# Patient Record
Sex: FEMALE | Employment: FULL TIME | URBAN - METROPOLITAN AREA
[De-identification: names, ages, dates, MRNs, and addresses within clinical notes are randomized per-mention and may not be internally consistent; named-entity substitution may affect disease eponyms.]

---

## 2023-11-28 NOTE — PROGRESS NOTES
Subjective:   Patient ID: Linnea Ruano is a 53 y.o. female.  Chief Complaint:  Establish Care and Annual Exam    Presents to establish care/annual physical exam   Has not seen an MD and years     Denies any significant past medical history  Surgical history for hysterectomy and lumpectomy of breast  No known drug allergies   No current medications   Social history includes former smoker.  Quit 2 years ago.  Does have 20 pack-year history.  Drinks 2-4 alcoholic beverages per week.  No illicit drug use.  Family history includes mother, , hypertension, coronary artery disease, heart attack.  Father's history is unknown.  No known colon or breast cancer.    Health maintenance with unknown last tetanus booster, no previous COVID vaccines, no previous shingles vaccines, and reported flu vaccine in     Main complaint/concern today is right hand numbness and tingling   Primarily 1st 3 digits   Greatest at night and causing difficulty sleeping  Over-the-counter Tylenol no help    Review of Systems   Constitutional:  Negative for chills, fatigue and fever.   HENT:  Negative for congestion, dental problem, ear pain, postnasal drip, rhinorrhea, sinus pressure and sore throat.    Eyes:  Negative for visual disturbance.   Respiratory:  Negative for cough, chest tightness, shortness of breath and wheezing.    Cardiovascular:  Negative for chest pain, palpitations and leg swelling.   Gastrointestinal:  Negative for abdominal pain, blood in stool, constipation, diarrhea, nausea and vomiting.   Endocrine: Negative for polydipsia, polyphagia and polyuria.   Genitourinary:  Negative for difficulty urinating, dysuria, flank pain, hematuria and pelvic pain.   Musculoskeletal:  Negative for myalgias.   Skin:  Negative for rash.   Neurological:  Positive for numbness. Negative for dizziness, tremors, syncope, weakness, light-headedness and headaches.   Hematological:  Negative for adenopathy.   Psychiatric/Behavioral:   "Negative for decreased concentration, dysphoric mood and sleep disturbance. The patient is not nervous/anxious.        Objective:   BP (!) 142/92 (BP Location: Left arm, Patient Position: Sitting, BP Method: Large (Manual))   Pulse 84   Ht 5' 1" (1.549 m)   Wt 87.7 kg (193 lb 5.5 oz)   SpO2 97%   BMI 36.53 kg/m²     Physical Exam  Vitals and nursing note reviewed.   Constitutional:       Appearance: Normal appearance. She is well-developed. She is obese.      Comments:   Blood pressure mildly elevated   HENT:      Right Ear: Tympanic membrane and ear canal normal.      Left Ear: Tympanic membrane and ear canal normal.      Mouth/Throat:      Dentition: Abnormal dentition. Gingival swelling and dental caries present.      Pharynx: Oropharynx is clear. Uvula midline.   Neck:      Thyroid: No thyroid mass, thyromegaly or thyroid tenderness.   Cardiovascular:      Rate and Rhythm: Normal rate and regular rhythm.      Heart sounds: Murmur heard.   Pulmonary:      Effort: Pulmonary effort is normal.      Breath sounds: Normal breath sounds.   Abdominal:      General: There is no distension.      Palpations: Abdomen is soft.      Tenderness: There is no abdominal tenderness.   Musculoskeletal:      Right lower leg: No edema.      Left lower leg: No edema.   Skin:     Findings: No rash.   Neurological:      Mental Status: She is alert and oriented to person, place, and time.   Psychiatric:         Mood and Affect: Mood and affect normal.       Assessment:       ICD-10-CM ICD-9-CM   1. Annual physical exam  Z00.00 V70.0   2. Need for hepatitis C screening test  Z11.59 V73.89   3. Screening for HIV (human immunodeficiency virus)  Z11.4 V73.89   4. Screening for colon cancer  Z12.11 V76.51   5. Encounter for gynecological examination  Z01.419 V72.31   6. Encounter for screening mammogram for malignant neoplasm of breast  Z12.31 V76.12   7. Heart murmur  R01.1 785.2   8. Dental caries  K02.9 521.00   9. Elevated blood " pressure reading without diagnosis of hypertension  R03.0 796.2   10. Carpal tunnel syndrome, right  G56.01 354.0   11. Former smoker  Z87.891 V15.82   12. Screening for lung cancer  Z12.2 V76.0     Plan:   Annual physical exam  Need for hepatitis C screening test  Screening for HIV (human immunodeficiency virus)  Screening for colon cancer  Encounter for gynecological examination  Encounter for screening mammogram for malignant neoplasm of breast  -     CBC Without Differential; Future; Expected date: 11/29/2023  -     Comprehensive Metabolic Panel; Future; Expected date: 11/29/2023  -     Lipid Panel; Future; Expected date: 11/29/2023  -     TSH; Future; Expected date: 11/29/2023  -     Hemoglobin A1C; Future; Expected date: 11/29/2023  -     HIV 1/2 Ag/Ab (4th Gen); Future; Expected date: 11/29/2023  -     Hepatitis C Antibody; Future; Expected date: 11/29/2023  -     Mammo Digital Screening Bilat w/ Deandre; Future; Expected date: 11/29/2023  -     Ambulatory referral/consult to Gynecology; Future; Expected date: 12/06/2023  -     Cologuard Screening (Multitarget Stool DNA); Future; Expected date: 11/29/2023  Blood pressure elevated.  BMI 36.5.  Exam remarkable for dental caries and heartburn.    Check labs.  Treat as indicated.    Agrees to Cologuard test for colon cancer screening  Gyn exam and mammogram ordered   Rediscuss tetanus booster, shingles vaccine, COVID vaccine, flu vaccine at follow-up visit    Heart murmur  Dental caries  -     Echo; Future    Elevated blood pressure reading without diagnosis of hypertension  Patient education/handout on diet and lifestyle changes to help promote better blood pressure control  Recheck blood pressure at follow-up visit   If remains elevated, will need to start medication     Carpal tunnel syndrome, right  Patient education/handout on carpal tunnel syndrome   Obtain wrist splint and wear nightly sleeping   Take ibuprofen 3 pills 30 minutes prior to bedtime   Reassess  at follow-up visit     Former smoker  Screening for lung cancer  -     CT Chest Lung Screening Low Dose; Future; Expected date: 11/29/2023  Greater than 50 years old, only quit smoking 2 years ago, greater than 20 pack year history.    Meets criteria for low-dose CT lung cancer screening.  Agrees to testing.  Image ordered.      Return to clinic one-month

## 2023-11-29 ENCOUNTER — TELEPHONE (OUTPATIENT)
Dept: INTERNAL MEDICINE | Facility: CLINIC | Age: 53
End: 2023-11-29

## 2023-11-29 ENCOUNTER — OFFICE VISIT (OUTPATIENT)
Dept: INTERNAL MEDICINE | Facility: CLINIC | Age: 53
End: 2023-11-29
Payer: COMMERCIAL

## 2023-11-29 ENCOUNTER — LAB VISIT (OUTPATIENT)
Dept: LAB | Facility: HOSPITAL | Age: 53
End: 2023-11-29
Attending: FAMILY MEDICINE
Payer: COMMERCIAL

## 2023-11-29 VITALS
BODY MASS INDEX: 36.5 KG/M2 | WEIGHT: 193.31 LBS | SYSTOLIC BLOOD PRESSURE: 142 MMHG | OXYGEN SATURATION: 97 % | HEIGHT: 61 IN | DIASTOLIC BLOOD PRESSURE: 92 MMHG | HEART RATE: 84 BPM

## 2023-11-29 DIAGNOSIS — Z11.4 SCREENING FOR HIV (HUMAN IMMUNODEFICIENCY VIRUS): ICD-10-CM

## 2023-11-29 DIAGNOSIS — R01.1 HEART MURMUR: ICD-10-CM

## 2023-11-29 DIAGNOSIS — Z01.419 ENCOUNTER FOR GYNECOLOGICAL EXAMINATION: ICD-10-CM

## 2023-11-29 DIAGNOSIS — Z11.59 NEED FOR HEPATITIS C SCREENING TEST: ICD-10-CM

## 2023-11-29 DIAGNOSIS — Z12.11 SCREENING FOR COLON CANCER: ICD-10-CM

## 2023-11-29 DIAGNOSIS — Z12.2 SCREENING FOR LUNG CANCER: ICD-10-CM

## 2023-11-29 DIAGNOSIS — Z00.00 ANNUAL PHYSICAL EXAM: Primary | ICD-10-CM

## 2023-11-29 DIAGNOSIS — Z12.31 ENCOUNTER FOR SCREENING MAMMOGRAM FOR MALIGNANT NEOPLASM OF BREAST: ICD-10-CM

## 2023-11-29 DIAGNOSIS — G56.01 CARPAL TUNNEL SYNDROME, RIGHT: ICD-10-CM

## 2023-11-29 DIAGNOSIS — Z00.00 ANNUAL PHYSICAL EXAM: ICD-10-CM

## 2023-11-29 DIAGNOSIS — K02.9 DENTAL CARIES: ICD-10-CM

## 2023-11-29 DIAGNOSIS — R03.0 ELEVATED BLOOD PRESSURE READING WITHOUT DIAGNOSIS OF HYPERTENSION: ICD-10-CM

## 2023-11-29 DIAGNOSIS — Z87.891 FORMER SMOKER: ICD-10-CM

## 2023-11-29 LAB
ESTIMATED AVG GLUCOSE: 74 MG/DL (ref 68–131)
HBA1C MFR BLD: 4.2 % (ref 4–5.6)

## 2023-11-29 PROCEDURE — 1160F PR REVIEW ALL MEDS BY PRESCRIBER/CLIN PHARMACIST DOCUMENTED: ICD-10-PCS | Mod: CPTII,S$GLB,, | Performed by: FAMILY MEDICINE

## 2023-11-29 PROCEDURE — 87389 HIV-1 AG W/HIV-1&-2 AB AG IA: CPT | Performed by: FAMILY MEDICINE

## 2023-11-29 PROCEDURE — 80053 COMPREHEN METABOLIC PANEL: CPT | Performed by: FAMILY MEDICINE

## 2023-11-29 PROCEDURE — 1160F RVW MEDS BY RX/DR IN RCRD: CPT | Mod: CPTII,S$GLB,, | Performed by: FAMILY MEDICINE

## 2023-11-29 PROCEDURE — 3080F DIAST BP >= 90 MM HG: CPT | Mod: CPTII,S$GLB,, | Performed by: FAMILY MEDICINE

## 2023-11-29 PROCEDURE — 3077F SYST BP >= 140 MM HG: CPT | Mod: CPTII,S$GLB,, | Performed by: FAMILY MEDICINE

## 2023-11-29 PROCEDURE — 83036 HEMOGLOBIN GLYCOSYLATED A1C: CPT | Performed by: FAMILY MEDICINE

## 2023-11-29 PROCEDURE — 3008F PR BODY MASS INDEX (BMI) DOCUMENTED: ICD-10-PCS | Mod: CPTII,S$GLB,, | Performed by: FAMILY MEDICINE

## 2023-11-29 PROCEDURE — 99386 PREV VISIT NEW AGE 40-64: CPT | Mod: S$GLB,,, | Performed by: FAMILY MEDICINE

## 2023-11-29 PROCEDURE — 3008F BODY MASS INDEX DOCD: CPT | Mod: CPTII,S$GLB,, | Performed by: FAMILY MEDICINE

## 2023-11-29 PROCEDURE — 84439 ASSAY OF FREE THYROXINE: CPT | Performed by: FAMILY MEDICINE

## 2023-11-29 PROCEDURE — 3080F PR MOST RECENT DIASTOLIC BLOOD PRESSURE >= 90 MM HG: ICD-10-PCS | Mod: CPTII,S$GLB,, | Performed by: FAMILY MEDICINE

## 2023-11-29 PROCEDURE — 36415 COLL VENOUS BLD VENIPUNCTURE: CPT | Performed by: FAMILY MEDICINE

## 2023-11-29 PROCEDURE — 84443 ASSAY THYROID STIM HORMONE: CPT | Performed by: FAMILY MEDICINE

## 2023-11-29 PROCEDURE — 1159F PR MEDICATION LIST DOCUMENTED IN MEDICAL RECORD: ICD-10-PCS | Mod: CPTII,S$GLB,, | Performed by: FAMILY MEDICINE

## 2023-11-29 PROCEDURE — 99999 PR PBB SHADOW E&M-NEW PATIENT-LVL IV: CPT | Mod: PBBFAC,,, | Performed by: FAMILY MEDICINE

## 2023-11-29 PROCEDURE — 99386 PR PREVENTIVE VISIT,NEW,40-64: ICD-10-PCS | Mod: S$GLB,,, | Performed by: FAMILY MEDICINE

## 2023-11-29 PROCEDURE — 99999 PR PBB SHADOW E&M-NEW PATIENT-LVL IV: ICD-10-PCS | Mod: PBBFAC,,, | Performed by: FAMILY MEDICINE

## 2023-11-29 PROCEDURE — 86803 HEPATITIS C AB TEST: CPT | Performed by: FAMILY MEDICINE

## 2023-11-29 PROCEDURE — 1159F MED LIST DOCD IN RCRD: CPT | Mod: CPTII,S$GLB,, | Performed by: FAMILY MEDICINE

## 2023-11-29 PROCEDURE — 80061 LIPID PANEL: CPT | Performed by: FAMILY MEDICINE

## 2023-11-29 PROCEDURE — 3077F PR MOST RECENT SYSTOLIC BLOOD PRESSURE >= 140 MM HG: ICD-10-PCS | Mod: CPTII,S$GLB,, | Performed by: FAMILY MEDICINE

## 2023-11-29 PROCEDURE — 85027 COMPLETE CBC AUTOMATED: CPT | Performed by: FAMILY MEDICINE

## 2023-11-29 NOTE — TELEPHONE ENCOUNTER
----- Message from Rafael Montgomery sent at 11/29/2023  3:52 PM CST -----  Contact: patient  Type:  Patient Call          Who Called: patient         Does the patient know what this is regarding?: requesting a call back for a 2 week follow up appt from her visit on today ; please advise           Would the patient rather a call back or a response via [facility]? Call           Best Call Back Number:[phone] (home)              Additional Information:

## 2023-11-30 DIAGNOSIS — R76.8 HEPATITIS C ANTIBODY TEST POSITIVE: Primary | ICD-10-CM

## 2023-11-30 LAB
ALBUMIN SERPL BCP-MCNC: 4 G/DL (ref 3.5–5.2)
ALP SERPL-CCNC: 132 U/L (ref 55–135)
ALT SERPL W/O P-5'-P-CCNC: 66 U/L (ref 10–44)
ANION GAP SERPL CALC-SCNC: 10 MMOL/L (ref 8–16)
AST SERPL-CCNC: 39 U/L (ref 10–40)
BILIRUB SERPL-MCNC: 0.4 MG/DL (ref 0.1–1)
BUN SERPL-MCNC: 18 MG/DL (ref 6–20)
CALCIUM SERPL-MCNC: 9.8 MG/DL (ref 8.7–10.5)
CHLORIDE SERPL-SCNC: 103 MMOL/L (ref 95–110)
CHOLEST SERPL-MCNC: 250 MG/DL (ref 120–199)
CHOLEST/HDLC SERPL: 3.5 {RATIO} (ref 2–5)
CO2 SERPL-SCNC: 25 MMOL/L (ref 23–29)
CREAT SERPL-MCNC: 0.8 MG/DL (ref 0.5–1.4)
ERYTHROCYTE [DISTWIDTH] IN BLOOD BY AUTOMATED COUNT: 12.8 % (ref 11.5–14.5)
EST. GFR  (NO RACE VARIABLE): >60 ML/MIN/1.73 M^2
GLUCOSE SERPL-MCNC: 82 MG/DL (ref 70–110)
HCT VFR BLD AUTO: 47.5 % (ref 37–48.5)
HCV AB SERPL QL IA: REACTIVE
HDLC SERPL-MCNC: 71 MG/DL (ref 40–75)
HDLC SERPL: 28.4 % (ref 20–50)
HGB BLD-MCNC: 15.6 G/DL (ref 12–16)
HIV 1+2 AB+HIV1 P24 AG SERPL QL IA: NORMAL
LDLC SERPL CALC-MCNC: 156.6 MG/DL (ref 63–159)
MCH RBC QN AUTO: 30.4 PG (ref 27–31)
MCHC RBC AUTO-ENTMCNC: 32.8 G/DL (ref 32–36)
MCV RBC AUTO: 92 FL (ref 82–98)
NONHDLC SERPL-MCNC: 179 MG/DL
PLATELET # BLD AUTO: 433 K/UL (ref 150–450)
PMV BLD AUTO: 10.8 FL (ref 9.2–12.9)
POTASSIUM SERPL-SCNC: 4 MMOL/L (ref 3.5–5.1)
PROT SERPL-MCNC: 7.4 G/DL (ref 6–8.4)
RBC # BLD AUTO: 5.14 M/UL (ref 4–5.4)
SODIUM SERPL-SCNC: 138 MMOL/L (ref 136–145)
T4 FREE SERPL-MCNC: 0.93 NG/DL (ref 0.71–1.51)
TRIGL SERPL-MCNC: 112 MG/DL (ref 30–150)
TSH SERPL DL<=0.005 MIU/L-ACNC: 4.29 UIU/ML (ref 0.4–4)
WBC # BLD AUTO: 7.88 K/UL (ref 3.9–12.7)

## 2023-12-29 ENCOUNTER — TELEPHONE (OUTPATIENT)
Dept: CARDIOLOGY | Facility: HOSPITAL | Age: 53
End: 2023-12-29
Payer: COMMERCIAL

## 2024-01-04 ENCOUNTER — TELEPHONE (OUTPATIENT)
Dept: CARDIOLOGY | Facility: HOSPITAL | Age: 54
End: 2024-01-04
Payer: COMMERCIAL

## 2024-08-22 ENCOUNTER — PATIENT OUTREACH (OUTPATIENT)
Dept: ADMINISTRATIVE | Facility: HOSPITAL | Age: 54
End: 2024-08-22
Payer: COMMERCIAL

## 2024-08-22 NOTE — PROGRESS NOTES
VBHM Score: 2     Colon Cancer Screening  Mammogram                       Health Maintenance Topic(s) Outreach Outcomes & Actions Taken:    Breast Cancer Screening - Outreach Outcomes & Actions Taken  : Overdue    Colorectal Cancer Screening - Outreach Outcomes & Actions Taken  : Cologuard ordered 11.29.23 collection not complete       Additional Notes:  Attempted to contact pt no ans, lvm, last portal access was 12.14.23, cologuard was ordered 11.29.23 not completed, Mammogram overdue               Care Management, Digital Medicine, and/or Education Referrals    OPCM Risk Score: 15.2         Next Steps - Referral Actions: Not high risk

## 2025-02-24 ENCOUNTER — PATIENT OUTREACH (OUTPATIENT)
Dept: ADMINISTRATIVE | Facility: HOSPITAL | Age: 55
End: 2025-02-24
Payer: COMMERCIAL

## 2025-02-24 NOTE — PROGRESS NOTES
VBC OUTREACH: attempted to contact pt to confirm PCP, last appt 11.2023, [facility] and at that time reported she had not seen a MD in years.  Attempted to contact no ans, lvm, pt has not accessed portal since 12.14.23.  Pt does NOT MEET CRITERIA for VB program.

## 2025-06-19 ENCOUNTER — PATIENT OUTREACH (OUTPATIENT)
Dept: ADMINISTRATIVE | Facility: HOSPITAL | Age: 55
End: 2025-06-19
Payer: COMMERCIAL